# Patient Record
Sex: FEMALE | Race: WHITE | NOT HISPANIC OR LATINO | URBAN - METROPOLITAN AREA
[De-identification: names, ages, dates, MRNs, and addresses within clinical notes are randomized per-mention and may not be internally consistent; named-entity substitution may affect disease eponyms.]

---

## 2021-03-15 ENCOUNTER — OFFICE (OUTPATIENT)
Dept: URBAN - METROPOLITAN AREA CLINIC 11 | Facility: CLINIC | Age: 76
End: 2021-03-15

## 2021-03-15 VITALS
SYSTOLIC BLOOD PRESSURE: 120 MMHG | HEART RATE: 82 BPM | DIASTOLIC BLOOD PRESSURE: 59 MMHG | OXYGEN SATURATION: 100 % | HEIGHT: 64 IN | WEIGHT: 149 LBS

## 2021-03-15 DIAGNOSIS — K25.1 ACUTE GASTRIC ULCER WITH PERFORATION: ICD-10-CM

## 2021-03-15 PROCEDURE — 99214 OFFICE O/P EST MOD 30 MIN: CPT | Performed by: INTERNAL MEDICINE

## 2021-03-15 NOTE — SERVICEHPINOTES
75-year-old white female referred by Dr. Nick for follow-up after she presented to the hospital 3 weeks ago at [facility] with an acute perforated gastric ulcer.  Dr. Dorman operated emergently and patch the ulcer.  She did well postoperatively and has done well over the last 3 weeks.  She is eating a regular diet now and her weight is stable. She is on pantoprazole 40 mg twice daily and Carafate twice daily.  Patient does take chronic opioids for many years because of back pain and hip pain. She had no sign or symptom of problems prior to the acute perforation with no upper GI symptoms whatsoever.  She was not on acid suppression.  There has been no sign of any bleeding or melena.  She has no cardiac history and no chest pain or shortness of breath.  There is no family history of gastrointestinal carcinoma.  She had a colonoscopy greater than 10 years ago but had a cologuard study 2 years ago which was negative.

## 2021-05-06 ENCOUNTER — AMBULATORY SURGICAL CENTER (OUTPATIENT)
Dept: URBAN - METROPOLITAN AREA SURGERY 3 | Facility: SURGERY | Age: 76
End: 2021-05-06
Payer: COMMERCIAL

## 2021-05-06 ENCOUNTER — OFFICE (OUTPATIENT)
Dept: URBAN - METROPOLITAN AREA PATHOLOGY 22 | Facility: PATHOLOGY | Age: 76
End: 2021-05-06
Payer: COMMERCIAL

## 2021-05-06 ENCOUNTER — AMBULATORY SURGICAL CENTER (OUTPATIENT)
Dept: URBAN - METROPOLITAN AREA SURGERY 3 | Facility: SURGERY | Age: 76
End: 2021-05-06

## 2021-05-06 VITALS
TEMPERATURE: 97.2 F | RESPIRATION RATE: 16 BRPM | TEMPERATURE: 98.2 F | SYSTOLIC BLOOD PRESSURE: 114 MMHG | DIASTOLIC BLOOD PRESSURE: 88 MMHG | SYSTOLIC BLOOD PRESSURE: 94 MMHG | WEIGHT: 135 LBS | SYSTOLIC BLOOD PRESSURE: 121 MMHG | HEIGHT: 64 IN | TEMPERATURE: 97.2 F | DIASTOLIC BLOOD PRESSURE: 63 MMHG | OXYGEN SATURATION: 95 % | SYSTOLIC BLOOD PRESSURE: 114 MMHG | RESPIRATION RATE: 17 BRPM | RESPIRATION RATE: 16 BRPM | DIASTOLIC BLOOD PRESSURE: 63 MMHG | OXYGEN SATURATION: 100 % | OXYGEN SATURATION: 95 % | SYSTOLIC BLOOD PRESSURE: 114 MMHG | RESPIRATION RATE: 19 BRPM | OXYGEN SATURATION: 97 % | RESPIRATION RATE: 19 BRPM | WEIGHT: 135 LBS | HEART RATE: 77 BPM | DIASTOLIC BLOOD PRESSURE: 70 MMHG | DIASTOLIC BLOOD PRESSURE: 70 MMHG | OXYGEN SATURATION: 98 % | HEIGHT: 64 IN | HEART RATE: 76 BPM | RESPIRATION RATE: 19 BRPM | WEIGHT: 135 LBS | SYSTOLIC BLOOD PRESSURE: 94 MMHG | DIASTOLIC BLOOD PRESSURE: 63 MMHG | TEMPERATURE: 98.2 F | OXYGEN SATURATION: 98 % | RESPIRATION RATE: 17 BRPM | HEART RATE: 76 BPM | RESPIRATION RATE: 16 BRPM | SYSTOLIC BLOOD PRESSURE: 94 MMHG | TEMPERATURE: 98.2 F | OXYGEN SATURATION: 100 % | HEART RATE: 88 BPM | DIASTOLIC BLOOD PRESSURE: 88 MMHG | OXYGEN SATURATION: 95 % | OXYGEN SATURATION: 100 % | SYSTOLIC BLOOD PRESSURE: 121 MMHG | OXYGEN SATURATION: 97 % | SYSTOLIC BLOOD PRESSURE: 121 MMHG | HEART RATE: 76 BPM | SYSTOLIC BLOOD PRESSURE: 112 MMHG | DIASTOLIC BLOOD PRESSURE: 70 MMHG | DIASTOLIC BLOOD PRESSURE: 88 MMHG | HEART RATE: 88 BPM | HEART RATE: 88 BPM | HEART RATE: 77 BPM | SYSTOLIC BLOOD PRESSURE: 112 MMHG | TEMPERATURE: 97.2 F | RESPIRATION RATE: 17 BRPM | SYSTOLIC BLOOD PRESSURE: 112 MMHG | OXYGEN SATURATION: 98 % | HEIGHT: 64 IN | HEART RATE: 77 BPM | OXYGEN SATURATION: 97 %

## 2021-05-06 DIAGNOSIS — K29.50 UNSPECIFIED CHRONIC GASTRITIS WITHOUT BLEEDING: ICD-10-CM

## 2021-05-06 DIAGNOSIS — K25.1 ACUTE GASTRIC ULCER WITH PERFORATION: ICD-10-CM

## 2021-05-06 PROBLEM — K31.89 OTHER DISEASES OF STOMACH AND DUODENUM: Status: ACTIVE | Noted: 2021-05-06

## 2021-05-06 PROCEDURE — 43239 EGD BIOPSY SINGLE/MULTIPLE: CPT | Performed by: INTERNAL MEDICINE

## 2021-05-06 PROCEDURE — 88342 IMHCHEM/IMCYTCHM 1ST ANTB: CPT | Performed by: INTERNAL MEDICINE

## 2021-05-06 PROCEDURE — 88313 SPECIAL STAINS GROUP 2: CPT | Performed by: INTERNAL MEDICINE

## 2021-05-06 PROCEDURE — 88305 TISSUE EXAM BY PATHOLOGIST: CPT | Performed by: INTERNAL MEDICINE

## 2021-05-06 NOTE — SERVICEHPINOTES
75-year-old white female referred by Dr. Nick for follow-up after she presented to the hospital 3 weeks ago at [facility] with an acute perforated gastric ulcer. Dr. Dorman operated emergently and patch the ulcer. She did well postoperatively and has done well over the last 3 weeks. She is eating a regular diet now and her weight is stable. She is on pantoprazole 40 mg twice daily and Carafate twice daily. Patient does take chronic opioids for many years because of back pain and hip pain. She had no sign or symptom of problems prior to the acute perforation with no upper GI symptoms whatsoever. She was not on acid suppression. There has been no sign of any bleeding or melena. She has no cardiac history and no chest pain or shortness of breath. There is no family history of gastrointestinal carcinoma. She had a colonoscopy greater than 10 years ago but had a

## 2021-05-06 NOTE — SERVICEHPINOTES
75-year-old white female referred by Dr. Nick for follow-up after she presented to the hospital 3 weeks ago at [facility] with an acute perforated gastric ulcer. Dr. Dorman operated emergently and patch the ulcer. She did well postoperatively and has done well over the last 3 weeks. She is eating a regular diet now and her weight is stable. She is on pantoprazole 40 mg twice daily and Carafate twice daily. Patient does take chronic opioids for many years because of back pain and hip pain. She had no sign or symptom of problems prior to the acute perforation with no upper GI symptoms whatsoever. She was not on acid suppression. There has been no sign of any bleeding or melena. She has no cardiac history and no chest pain or shortness of breath. There is no family history of gastrointestinal carcinoma. She had a colonoscopy greater than 10 years ago but had a

## 2021-05-06 NOTE — SERVICEHPINOTES
75-year-old white female referred by Dr. Nick for follow-up after she presented to the hospital 3 weeks ago at [facility] with an acute perforated gastric ulcer. Dr. Dorman operated emergently and patch the ulcer. She did well postoperatively and has done well over the last 3 weeks. She is eating a regular diet now and her weight is stable. She is on pantoprazole 40 mg twice daily and Carafate twice daily. Patient does take chronic opioids for many years because of back pain and hip pain. She had no sign or symptom of problems prior to the acute perforation with no upper GI symptoms whatsoever. She was not on acid suppression. There has been no sign of any bleeding or melena. She has no cardiac history and no chest pain or shortness of breath. There is no family history of gastrointestinal carcinoma. She had a colonoscopy greater than 10 years ago but had a